# Patient Record
Sex: FEMALE | Race: OTHER | Employment: FULL TIME | URBAN - METROPOLITAN AREA
[De-identification: names, ages, dates, MRNs, and addresses within clinical notes are randomized per-mention and may not be internally consistent; named-entity substitution may affect disease eponyms.]

---

## 2018-10-04 PROCEDURE — 88175 CYTOPATH C/V AUTO FLUID REDO: CPT | Performed by: OBSTETRICS & GYNECOLOGY

## 2018-10-04 PROCEDURE — 87624 HPV HI-RISK TYP POOLED RSLT: CPT | Performed by: OBSTETRICS & GYNECOLOGY

## 2020-02-10 ENCOUNTER — TELEPHONE (OUTPATIENT)
Dept: PERINATAL CARE | Facility: HOSPITAL | Age: 35
End: 2020-02-10

## 2020-02-18 NOTE — PROGRESS NOTES
Outpatient Maternal-Fetal Medicine Consultation    Dear Dr. Lisbeth Ramirez,    Thank you for requesting ultrasound evaluation and maternal fetal medicine consultation on your patient Sher Banda.   As you are aware she is a 29year old female with a Singl 5' 2\" (1.575 m)   Wt 157 lb (71.2 kg)   LMP 09/09/2019   BMI 28.72 kg/m²   General: alert and oriented,no acute distress  Abdomen: gravid, soft, non-tender  Extremities: non-tender, no edema    OBSTETRIC ULTRASOUND  The patient had a level II ultrasound t midline falx, cerebellum, cerebellar lobes, posterior fossa, vermis, cavum septi pellucidi. Heart: Visualized and normal appearance: cardiac location, four-chamber view, left outflow tract, right outflow tract, great vessels, Ductal arch.    Aortic arch: N obstetric care are advised.     Medical Complications    Women 28years of age or older can expect to experience two to three fold higher rates of hospitalization,  delivery, and pregnancy-related complications when compared to their younger counter clubfoot, and diaphragmatic hernia appear to occur with increased frequency in offspring of older women. These abnormalities are structural and unrelated to aneuploidy, thus they would not be detected by karyotype analysis.   For these reasons a complete, d instrumentation (voluntary termination, DC) or uterine infections to explain scar formation. However, it is not unusual to encounter patients with a synechia in whom any relevant past history is lacking.  Most commonly, uterine synechiae are noted as an inc

## 2020-02-25 ENCOUNTER — OFFICE VISIT (OUTPATIENT)
Dept: PERINATAL CARE | Facility: HOSPITAL | Age: 35
End: 2020-02-25
Attending: OBSTETRICS & GYNECOLOGY
Payer: COMMERCIAL

## 2020-02-25 VITALS
HEART RATE: 70 BPM | SYSTOLIC BLOOD PRESSURE: 126 MMHG | WEIGHT: 157 LBS | BODY MASS INDEX: 28.89 KG/M2 | HEIGHT: 62 IN | DIASTOLIC BLOOD PRESSURE: 83 MMHG

## 2020-02-25 DIAGNOSIS — O09.529 SUPERVISION OF HIGH-RISK PREGNANCY OF ELDERLY MULTIGRAVIDA: ICD-10-CM

## 2020-02-25 DIAGNOSIS — Z98.891 H/O: C-SECTION: ICD-10-CM

## 2020-02-25 DIAGNOSIS — N85.6 UTERINE SYNECHIAE: ICD-10-CM

## 2020-02-25 PROCEDURE — 99243 OFF/OP CNSLTJ NEW/EST LOW 30: CPT | Performed by: OBSTETRICS & GYNECOLOGY

## 2020-02-25 PROCEDURE — 76811 OB US DETAILED SNGL FETUS: CPT | Performed by: OBSTETRICS & GYNECOLOGY

## 2020-05-08 PROBLEM — N85.6 UTERINE SYNECHIAE: Status: ACTIVE | Noted: 2020-05-08

## 2020-06-17 PROBLEM — O09.529 AMA (ADVANCED MATERNAL AGE) MULTIGRAVIDA 35+: Status: RESOLVED | Noted: 2020-02-25 | Resolved: 2020-06-08

## 2020-06-17 PROBLEM — N85.6 UTERINE SYNECHIAE: Status: RESOLVED | Noted: 2020-05-08 | Resolved: 2020-06-08

## 2021-11-18 ENCOUNTER — TELEPHONE (OUTPATIENT)
Dept: NEUROLOGY | Facility: CLINIC | Age: 36
End: 2021-11-18

## 2021-11-18 NOTE — TELEPHONE ENCOUNTER
Rec'd lab results from [facility], dated on 11/18/21; placed in providers folder for review

## 2021-12-08 ENCOUNTER — OFFICE VISIT (OUTPATIENT)
Dept: NEUROLOGY | Facility: CLINIC | Age: 36
End: 2021-12-08
Payer: COMMERCIAL

## 2021-12-08 VITALS
HEART RATE: 72 BPM | WEIGHT: 144 LBS | BODY MASS INDEX: 26 KG/M2 | DIASTOLIC BLOOD PRESSURE: 70 MMHG | RESPIRATION RATE: 16 BRPM | SYSTOLIC BLOOD PRESSURE: 120 MMHG

## 2021-12-08 DIAGNOSIS — M35.00 SJOGREN'S SYNDROME, WITH UNSPECIFIED ORGAN INVOLVEMENT (HCC): ICD-10-CM

## 2021-12-08 DIAGNOSIS — R20.2 PARESTHESIA: Primary | ICD-10-CM

## 2021-12-08 PROCEDURE — 3078F DIAST BP <80 MM HG: CPT | Performed by: OTHER

## 2021-12-08 PROCEDURE — 3074F SYST BP LT 130 MM HG: CPT | Performed by: OTHER

## 2021-12-08 PROCEDURE — 99244 OFF/OP CNSLTJ NEW/EST MOD 40: CPT | Performed by: OTHER

## 2021-12-08 NOTE — PROGRESS NOTES
Patient states tingling sensation throughout the body on and off. Patient states numbness in the both calf. Denies cramping sensation. Denies weakness or burning sensation. Patient states symptoms started 10/2021.  Patient states one minor fall in the middl

## 2021-12-08 NOTE — PROGRESS NOTES
[facility] OUTPATIENT NEUROLOGY CONSULTATION    Date of consult: 12/8/2021    Assessment:  Paresthesia    Probable Sjogren's    Plan:  MRI brain pending  EMG ordered to evaluate for peripheral neuropathy if pt is interested  Reviewed labs' records  PCP, Rheumatol Grandmother    • Other (emphysema) Maternal Grandmother    • Other (copd) Maternal Grandmother    • Other (colon cancer) Maternal Grandmother    • Other (Other) Sister         heart murmur      Objective:   Physical Examination:  /70   Pulse 72   Res

## 2021-12-09 NOTE — TELEPHONE ENCOUNTER
Pt seen in office 12/8/21. February appt is for follow up. Per Dr. Guillermo Darnell, reviewed records. No further action needed at this time.